# Patient Record
Sex: FEMALE | Race: WHITE
[De-identification: names, ages, dates, MRNs, and addresses within clinical notes are randomized per-mention and may not be internally consistent; named-entity substitution may affect disease eponyms.]

---

## 2021-03-23 ENCOUNTER — HOSPITAL ENCOUNTER (EMERGENCY)
Dept: HOSPITAL 15 - ER | Age: 21
Discharge: HOME | End: 2021-03-23
Payer: COMMERCIAL

## 2021-03-23 VITALS — BODY MASS INDEX: 39.24 KG/M2 | WEIGHT: 250 LBS | HEIGHT: 67 IN

## 2021-03-23 VITALS — SYSTOLIC BLOOD PRESSURE: 139 MMHG | DIASTOLIC BLOOD PRESSURE: 88 MMHG
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
## 2021-03-23 DIAGNOSIS — N39.0: ICD-10-CM

## 2021-03-23 DIAGNOSIS — S29.019A: Primary | ICD-10-CM

## 2021-03-23 DIAGNOSIS — F41.9: ICD-10-CM

## 2021-03-23 DIAGNOSIS — M41.9: ICD-10-CM
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
## 2021-03-23 PROCEDURE — 72080 X-RAY EXAM THORACOLMB 2/> VW: CPT

## 2021-03-23 PROCEDURE — 81002 URINALYSIS NONAUTO W/O SCOPE: CPT

## 2021-03-23 PROCEDURE — 81025 URINE PREGNANCY TEST: CPT

## 2021-03-23 PROCEDURE — 96372 THER/PROPH/DIAG INJ SC/IM: CPT

## 2021-03-23 PROCEDURE — 99283 EMERGENCY DEPT VISIT LOW MDM: CPT

## 2021-03-25 ENCOUNTER — HOSPITAL ENCOUNTER (EMERGENCY)
Dept: HOSPITAL 15 - ER | Age: 21
Discharge: HOME | End: 2021-03-25
Payer: COMMERCIAL

## 2021-03-25 VITALS — WEIGHT: 260 LBS | HEIGHT: 67 IN | BODY MASS INDEX: 40.81 KG/M2

## 2021-03-25 VITALS — SYSTOLIC BLOOD PRESSURE: 125 MMHG | DIASTOLIC BLOOD PRESSURE: 93 MMHG
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
## 2021-03-25 DIAGNOSIS — S90.461A: Primary | ICD-10-CM
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
## 2021-03-25 DIAGNOSIS — F41.9: ICD-10-CM

## 2021-04-08 ENCOUNTER — HOSPITAL ENCOUNTER (EMERGENCY)
Dept: HOSPITAL 15 - ER | Age: 21
Discharge: HOME | End: 2021-04-08
Payer: COMMERCIAL

## 2021-04-08 VITALS — HEIGHT: 67 IN | BODY MASS INDEX: 40.81 KG/M2 | WEIGHT: 260 LBS

## 2021-04-08 VITALS — SYSTOLIC BLOOD PRESSURE: 123 MMHG | DIASTOLIC BLOOD PRESSURE: 48 MMHG

## 2021-04-08 DIAGNOSIS — M62.830: ICD-10-CM
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
## 2021-04-08 DIAGNOSIS — S39.012A: Primary | ICD-10-CM
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
## 2021-04-08 DIAGNOSIS — F41.9: ICD-10-CM

## 2021-04-08 DIAGNOSIS — E66.01: ICD-10-CM

## 2021-04-08 PROCEDURE — 81002 URINALYSIS NONAUTO W/O SCOPE: CPT

## 2021-04-08 PROCEDURE — 81025 URINE PREGNANCY TEST: CPT

## 2021-04-12 ENCOUNTER — HOSPITAL ENCOUNTER (EMERGENCY)
Dept: HOSPITAL 15 - ER | Age: 21
Discharge: HOME | End: 2021-04-12
Payer: COMMERCIAL

## 2021-04-12 VITALS — WEIGHT: 280 LBS | BODY MASS INDEX: 43.95 KG/M2 | HEIGHT: 67 IN

## 2021-04-12 VITALS — DIASTOLIC BLOOD PRESSURE: 30 MMHG | SYSTOLIC BLOOD PRESSURE: 127 MMHG

## 2021-04-12 DIAGNOSIS — N39.0: ICD-10-CM

## 2021-04-12 DIAGNOSIS — M62.838: ICD-10-CM

## 2021-04-12 DIAGNOSIS — M54.5: Primary | ICD-10-CM

## 2021-04-12 PROCEDURE — 72100 X-RAY EXAM L-S SPINE 2/3 VWS: CPT

## 2021-04-12 PROCEDURE — 81025 URINE PREGNANCY TEST: CPT

## 2021-04-12 PROCEDURE — 81002 URINALYSIS NONAUTO W/O SCOPE: CPT

## 2021-06-13 ENCOUNTER — HOSPITAL ENCOUNTER (EMERGENCY)
Dept: HOSPITAL 15 - ER | Age: 21
Discharge: HOME | End: 2021-06-13
Payer: COMMERCIAL

## 2021-06-13 VITALS — DIASTOLIC BLOOD PRESSURE: 78 MMHG | SYSTOLIC BLOOD PRESSURE: 132 MMHG

## 2021-06-13 VITALS — HEIGHT: 62 IN | BODY MASS INDEX: 53.37 KG/M2 | WEIGHT: 290 LBS

## 2021-06-13 DIAGNOSIS — Z87.440: ICD-10-CM

## 2021-06-13 DIAGNOSIS — F32.9: ICD-10-CM

## 2021-06-13 DIAGNOSIS — L23.2: Primary | ICD-10-CM

## 2021-06-13 DIAGNOSIS — F41.9: ICD-10-CM

## 2022-04-04 ENCOUNTER — HOSPITAL ENCOUNTER (EMERGENCY)
Dept: HOSPITAL 15 - ER | Age: 22
LOS: 1 days | Discharge: HOME | End: 2022-04-05
Payer: COMMERCIAL

## 2022-04-04 VITALS — WEIGHT: 293 LBS | BODY MASS INDEX: 47.09 KG/M2 | HEIGHT: 66 IN

## 2022-04-04 DIAGNOSIS — Z91.14: ICD-10-CM

## 2022-04-04 DIAGNOSIS — F43.0: ICD-10-CM

## 2022-04-04 DIAGNOSIS — F32.9: ICD-10-CM

## 2022-04-04 DIAGNOSIS — F41.9: ICD-10-CM

## 2022-04-04 DIAGNOSIS — N39.0: Primary | ICD-10-CM

## 2022-04-04 PROCEDURE — 87086 URINE CULTURE/COLONY COUNT: CPT

## 2022-04-04 PROCEDURE — 81001 URINALYSIS AUTO W/SCOPE: CPT

## 2022-04-05 VITALS — SYSTOLIC BLOOD PRESSURE: 132 MMHG | DIASTOLIC BLOOD PRESSURE: 81 MMHG

## 2025-02-22 NOTE — DVH
Exam: CT CT AB PEL WO CON-NO ORAL OR IV



History: LLQ pain



Comparison Study:  None



Technique: Multidetector spiral CT of the abdomen was performed from lung bases to pubic symphysis.  
Imaging was performed without IV contrast.  Axial, coronal and sagittal multiplanar reformats were ob
tained from the axial data set by the technologist.



Radiation Dose : 



1. Abdomen/Pelvis:        CTDIvol 27.63 mGy, DLP 1464.22 mGy*cm.



Findings: 



Evaluation of solid organs is limited due to lack of intravenous contrast use.



Lung Bases: No abnormality demonstrated.  



Liver: Liver is normal in size.  No focal lesions. 



Gallbladder and Biliary Tree: No abnormality demonstrated.



Spleen: No abnormality demonstrated.



Pancreas: No abnormality demonstrated. 



Adrenal Glands: No abnormality demonstrated. 



Kidneys: No abnormality demonstrated. No renal calculus or hydroureteronephrosis. 



Bladder: Non distended.



Bowel: Stomach appears grossly unremarkable. No dilated or thick-walled loops of large or small bowel
 noted.  Appendix appears unremarkable. 



Ascites: Absent



Lymphadenopathy: No evidence of lymphadenopathy. 



Abdominal Wall and Mesentery: Unremarkable.



Vasculature: Unremarkable.



Pelvic Organs: No abnormality demonstrated. Evidence of small right adenexal cyst measuring approxima
tely 2.5 cm.



Musculoskeletal: No  bony lesions or fracture.   



IMPRESSION: 



No abnormality demonstrated. 



Radiation optimization: All CT scans at this facility use at least one of these dose optimization garland
hniques: automated exposure control  mA and/or kV adjustment per patient size (includes targeted exam
s where dose is matched to clinical indication)  or iterative reconstruction.



Electronically Signed by: Shane Núñez at 02/22/2025 20:26:06 PM

## 2025-02-22 NOTE — DVH
INDICATION: LLQ pain



TECHNIQUE: Multiple real-time grayscale transabdominal sonographic images along with color and duplex
 Doppler of the uterus and ovaries were obtained.



COMPARISON: None



FINDINGS: 



The uterus measures 7.4 X 3.3 X 4.5 cm and demonstrates no abnormality. Endometrium is within normal 
limits measuring 3.3 mm. 



Left ovary measures 2.3 X 1.8 X 2.8 cm and demonstrates no abnormality with normal Doppler color flow
.



Right ovary is not visualized.



No evidence of pelvic mass or fluid collection.



IMPRESSION: 



No abnormality demonstrated.



Electronically Signed by: Shane Núeñz at 02/22/2025 19:43:13 PM

## 2025-02-22 NOTE — ED.PDOC
General


HPI Comments


Initial Vital Signs:


Temp : 99F


BP: 124/85


HR: 90


RR: 18


SpO2: 99%





Past Medical History:


UTI, Gastritis, OCD





Past Surgical History:


Colonoscopy, Endoscopy





Social History:


Denies smoking, ETOH, or drug use.





Medications:


Sertraline, Keflex x4 days





Allergies:


NKDA





HPI:


Poor Historian.


24-year-old female presents to emergency department for evaluation of 10 day 

history of left lower quadrant/groin pain that is intermittent nonradiating.  No

alleviating precipitating factors.  Patient is visiting from [place].  Patient

went to her PCP in [place] UA was checked there and it was not positive and 

she was discharged home.  Patient then went to an ER and was told she has a UTI 

and was given Keflex which she has few pills left in the bottle.  She says that 

her pain has been getting worse.  She has minimal urinary symptoms of increased 

urinary frequency.  


Patient denies any pregnancy and denies being sexually active.  Denies any other

acute symptoms.





REVIEW OF SYSTEMS:





CONSTITUTIONAL:





Denies acute: fever, diaphoresis, chills,  generalized weakness.





HEAD:


Denies acute:  headache, photophobia





Eyes:


Denies acute: Double vision, vision loss, eye pain, eye discharge.





EARS: 


Denies acute: tinnitus, hearing loss, ear discharge, ear pain,





THROAT: 


Denies acute: sore throat, swelling, difficulty swallowing , pain with 

swallowing, change in  voice.





NECK:


Denies acute: neck pain, neck swelling, stiff neck.





HEART:


Denies acute : chest pain, palpitations,


 


LUNGS:


Denies acute: SOB, wheezing, cough, hemoptysis





ABDOMEN:


Denies acute:  , Nausea, Vomiting, diarrhea, melena , hematemesis, hematochezia





SKIN:


Denies acute: rash, redness, lesions, itchiness. 





EXTREMITIES:


Denies acute: calf pain, numbness, tingling, weakness, denies pain in extremity.


Denies acute: Low back pain. 





Neuro:


Denies acute: focal neurological deficit, motor or sensory focal neurological 

deficit, tremors, seizure like activity, confusion, dizziness, change in mental 

status, loss of bowel or bladder function, cauda equina like symptoms.  





: 


Denies acute: dysuria, hematuria, flank pain,  











PSYCH: 


Denies acute: hallucination, suicidal ideation, homicidal ideation.





FEMALE: 


Denies acute:  abnormal vaginal bleeding, foul odor, unusual discharge.  








PHYSICAL EXAM:


General: no acute distress, awake and alert.





Head: normocephalic, atraumatic.





Neck: 


supple, trachea is midline, no swelling. 








Throat:  Normal phonation.








Eyes:, no erythema, no purulent discharge, no proptosis, no icterus.


   


 


Heart:  regular rate, regular rhythm, no significant murmur appreciated.





Lungs: no apparent respiratory distress, 


   Able to speak in full sentences. 


No wheezing, no rhonchi, no crackles.  No stridors


   Clear to auscultation bilaterally.





Abdomen:  Left lower quadrant tender to palpation, non distended, soft, no 

guarding, no rebound,  + bowel sounds.  Morbidly obese











Neuro: Awake, Alert, oriented to name, self, situation, follows commands


 GCS=15.  


Speech is normal.





   


Skin: no petechia, no purpura, no cyanosis, non-pale, not jaundice. 





Lower extremities:  --no - Pitting edema


no deformity, no focal swelling, no calf TTP. 





Makes eye contact.





moves all four extremities.  


   





Face: no apparent facial droop.








No CVA tenderness to percussion bilaterally. 





Ambulating in the ED independently.





 


_________________________________________


ED COURSE:


Chief Complaint:  Urinary


Time Seen by MD:  18:38


Primary Care Provider:  OUT OF AREA IN [place]


Reviewed notes:  Medications, Allergies


Allergies:  


Coded Allergies:  


     NO KNOWN ALLERGIES (Unverified , 3/23/21)


Home Meds


Active Scripts


Ciprofloxacin Hcl (Ciprofloxacin Hcl) 500 Mg Tab, 1 TAB PO BID for 7 Days, #14 

TAB


   Prov:FIDENCIO TINAJERO MD         22


Information Source:  Patient


Mode of Arrival:  Ambulatory





Was a procedure done?


Was a procedure done?:  No





Differential Diagnosis


Kidney stone (Female):  N/A


Kidney stone (Male):  N/A


Urinary Problem (Male):  N/A


Urinary Problem (Female):  Other (DDX include Diverticulitis, colitis, 

gastroenteritis, acute abdomen, SBO, enteritis, constipation, volvulus, 

appendicitis, Gallbladder disease, choledocolithiasis, ascending cholangitis, 

pancreatitis, intraAbdominal mass/neoplasm, hepatitis, UTI, pylonephritis, 

kidney stone, aneurysm, dissection, Inflammatory bowel disease, gastroparesis,  

ischemic bowel, ovarian torsion, ovarian cyst/mass, tubo-ovarian abscess, 

pregnancy, ectopic pregnancy, PID, STD.)





X-Ray, Labs, Meds, VS





                                   Vital Signs








  Date Time  Temp Pulse Resp B/P (MAP) Pulse Ox O2 Delivery O2 Flow Rate FiO2


 


25 22:07 97.6 85 18 146/107 (120) 95   





 97.6       


 


25 21:20      Room Air* 0 21


 


25 18:20 99.0 90 18 124/85 (98) 99   








                                       Lab








Test


 25


20:44 25


18:44 25


18:15 Range/Units


 


 


Lactic Acid Level 2.1 *H 2.2 *H  0.4-2.0  mmol/L


 


White Blood Count


 


 12.2 H


 


 4.4-10.8


10^3/uL


 


Red Blood Count


 


 4.92 


 


 4.0-5.20


10^6/uL


 


Hemoglobin  11.2 L  12.2-16.2  g/dL


 


Hematocrit  34.7 L  36.0-46.0  %


 


Mean Corpuscular Volume  70.6 L  80.0-100.0  fL


 


Mean Corpuscular Hemoglobin  22.7 L  28.0-32.0  pg


 


Mean Corpuscular Hemoglobin


Concent 


 32.1 


 


 32.0-36.0  g/dL





 


Red Cell Distribution Width  17.3 H  11.8-14.3  %


 


Platelet Count


 


 375 


 


 140-450


10^3/uL


 


Mean Platelet Volume  7.8   6.9-10.8  fL


 


Neutrophils (%) (Auto)  60.1   37.0-80.0  %


 


Lymphocytes (%) (Auto)  32.0   10.0-50.0  %


 


Monocytes (%) (Auto)  4.6   0.0-12.0  %


 


Eosinophils (%) (Auto)  2.2   0.0-7.0  %


 


Basophils (%) (Auto)  1.1   0.0-2.0  %


 


Neutrophils # (Auto)


 


 7.3 


 


 1.6-8.6  10


^3/uL


 


Lymphocytes # (Auto)


 


 3.9 


 


 0.4-5.4  10


^3/uL


 


Monocytes # (Auto)  0.6   0-1.3  10 ^3/uL


 


Eosinophils # (Auto)  0.3   0-0.8  10 ^3/uL


 


Basophils # (Auto)  0.1   0-0.2  10 ^3/uL


 


Nucleated Red Blood Cells  0.0    %


 


Sodium Level  140   136-145  mmol/L


 


Potassium Level  3.7   3.5-5.1  mmol/L


 


Chloride Level  107     mmol/L


 


Carbon Dioxide Level  22   20-31  mmol/L


 


Anion Gap  11   5-15  


 


Blood Urea Nitrogen  7 L  9-23  mg/dL


 


Creatinine


 


 0.61 


 


 0.550-1.02


mg/dL


 


Glomerular Filtration Rate


Calc 


 128 


 


 >90  mL/min





 


BUN/Creatinine Ratio  11.5   10.0-20.0  


 


Serum Glucose  129 H    mg/dL


 


Calcium Level  10.0   8.7-10.4  mg/dL


 


Total Bilirubin  0.4   0.2-1.0  mg/dL


 


Aspartate Amino Transferase


(AST) 


 15 


 


 13-40  U/L





 


Alanine Aminotransferase (ALT)  22   7-40  U/L


 


Alkaline Phosphatase  85     U/L


 


Total Protein  6.8   5.7-8.2  g/dL


 


Albumin  4.6   3.2-4.8  g/dL


 


Urine Color   Light-yellow  Yellow  


 


Urine Clarity   Clear  Clear  


 


Urine pH   6.5  5.0-9.0  


 


Urine Specific Gravity   1.025  1.001-1.035  


 


Urine Protein   Negative  Negative  


 


Urine Ketones   Negative  Negative  


 


Urine Blood   Negative  Negative  /uL


 


Urine Nitrite   Negative  Negative  


 


Urine Bilirubin   Negative  Negative  


 


Urine Urobilinogen   Normal  Negative  mg/dL


 


Urine Leukocyte Esterase   Negative  Negative  /uL


 


Urine RBC   1  0 - 4  /hpf


 


Urine Microscopic WBC   < 1  0-5  /HPF


 


Urine Squamous Epithelial


Cells 


 


 Few 


 <5  /hpf





 


Urine Bacteria   None seen  None Seen  /hpf


 


Urine Mucus   Few  None Seen  


 


Urine Glucose   Normal  Normal  mg/dL








                               Current Medications








 Medications


  (Trade)  Dose


 Ordered  Sig/Jolynn


 Route  Start Time


 Stop Time Status Last Admin


 


 Piperacillin Sod/


 Tazobactam Sod  100 ml @ 


 100 mls/hr  ONCE  ONCE


 IV  25 20:00


 25 20:59 DC 25 21:43





 


 Sodium Chloride  1,000 ml @ 


 1,000 mls/hr  Q1H ONCE


 IV  25 20:00


 25 20:59 DC 25 21:43








[address] [address]


                              Ph: [phone]





                               DIAGNOSTIC IMAGING


                      Diagnostic Imaging Report : [id number]


                                     Signed








PATIENT: MARQUIS PANIAGUA    ACCT: [id number]        UNIT: [id number]


: 2000           LOC: ER                   ROOM / BED:  / 


AGE / SEX: 24 / F         ADM STATUS: REG ER        SERVICE DT: 25 [id number]





ORDERING PHYSICIAN: MARGARET BARCLAY DO


PROCEDURE(s): PELUS - PELVIC


REASON: LLQ pain


ORDER NUMBER(s): [id number], ACCESSION NUMBER(s): [id number]








INDICATION: LLQ pain





TECHNIQUE: Multiple real-time grayscale transabdominal sonographic images along 

with color and duplex Doppler of the uterus and ovaries were obtained.





COMPARISON: None





FINDINGS: 





The uterus measures 7.4 X 3.3 X 4.5 cm and demonstrates no abnormality. 

Endometrium is within normal limits measuring 3.3 mm. 





Left ovary measures 2.3 X 1.8 X 2.8 cm and demonstrates no abnormality with 

normal Doppler color flow.





Right ovary is not visualized.





No evidence of pelvic mass or fluid collection.





IMPRESSION: 





No abnormality demonstrated.





Electronically Signed by: Shane Bernstein at 2025 19:43:13 PM











DICTATED BY: SHANE BERNSTEIN MD


DICTATED DATE/TIME: 25





SIGNED BY: SHANE BERNSTEIN MD


SIGNED DATE/TIME: 25





CC: 








                             [place] [address]


                              Ph: [phone]





                               DIAGNOSTIC IMAGING


                      Diagnostic Imaging Report : [id number]


                                     Signed








PATIENT: MARQUIS PANIAGUA    ACCT: [id number]        UNIT: [id number]


: 2000           LOC: ER                   ROOM / BED:  / 


AGE / SEX: 24 / F         ADM STATUS: REG ER        SERVICE DT: 25





ORDERING PHYSICIAN: MARGARET BARCLAY DO


PROCEDURE(s): ABPL - CT AB PEL WO CON-NO ORAL OR IV


REASON: LLQ pain


ORDER NUMBER(s): [id number], ACCESSION NUMBER(s): [id number]








Exam: CT CT AB PEL WO CON-NO ORAL OR IV





History: LLQ pain





Comparison Study:  None





Technique: Multidetector spiral CT of the abdomen was performed from lung bases 

to pubic symphysis.  Imaging was performed without IV contrast.  Axial, coronal 

and sagittal multiplanar reformats were obtained from the axial data set by the 

technologist.





Radiation Dose : 





1. Abdomen/Pelvis:        CTDIvol 27.63 mGy, DLP 1464.22 mGy*cm.





Findings: 





Evaluation of solid organs is limited due to lack of intravenous contrast use.





Lung Bases: No abnormality demonstrated.  





Liver: Liver is normal in size.  No focal lesions. 





Gallbladder and Biliary Tree: No abnormality demonstrated.





Spleen: No abnormality demonstrated.





Pancreas: No abnormality demonstrated. 





Adrenal Glands: No abnormality demonstrated. 





Kidneys: No abnormality demonstrated. No renal calculus or 

hydroureteronephrosis. 





Bladder: Non distended.





Bowel: Stomach appears grossly unremarkable. No dilated or thick-walled loops of

large or small bowel noted.  Appendix appears unremarkable. 





Ascites: Absent





Lymphadenopathy: No evidence of lymphadenopathy. 





Abdominal Wall and Mesentery: Unremarkable.





Vasculature: Unremarkable.





Pelvic Organs: No abnormality demonstrated. Evidence of small right adenexal 

cyst measuring approximately 2.5 cm.





Musculoskeletal: No  bony lesions or fracture.   





IMPRESSION: 





No abnormality demonstrated. 





Radiation optimization: All CT scans at this facility use at least one of these 

dose optimization techniques: automated exposure control  mA and/or kV 

adjustment per patient size (includes targeted exams where dose is matched to 

clinical indication)  or iterative reconstruction.





Electronically Signed by: Shane Bernstein at 2025 20:26:06 PM











DICTATED BY: SHANE BERNSTEIN MD


DICTATED DATE/TIME: 25





SIGNED BY: SHANE BERNSTEIN MD


SIGNED DATE/TIME: 25





CC: 


Time of 1ST Reevaluation:  19:38


Reevaluation 1ST:  Unchanged


Patient Education/Counseling:  Diagnosis, Treatment


Family Education/Counseling:  No Family Present


Comments


Patient presented with the above HPI.--pelvic abdominal pain----workup was 

initiated. patient was found with the above mentioned diagnosis. 





the following medications were ordered:  


 please refer to order lists of meds and tests obtained by myself Dr. Barclay. 





Patient ED course and VS have been stabilized. Patient has been reassessed in 

the ED and remained in a stable condition.  





Pertinent incidental findings were discussed with the patient and/or family. 





Patient/family voices understanding and is agreeable with plan. 





Patient has been observed in the ED adequate length of time to insure 

improvement/stability. 





Escalation of care considered:


Consideration of escalation to observation or admission





 Patient was given antibiotics and given slight leukocytosis and slight elevated

lactic acid.





Patient was DISCHARGED home in a stable condition.





All the reports of any imaging studies that were ordered by myself were reviewed

by myself.





Departure 1


Departure


Time of Disposition:  20:50


Impression:  


   Primary Impression:  


   Left lower quadrant pain


   Additional Impression:  


   Anemia


Disposition:   HOME / SELF CARE / HOMELESS


Condition:  Stable





Additional Instructions:  


Additional discharge instructions:





You MUST follow-up with your primary care/family doctor in 1 to 2 days.





If you are unable to see your primary care/family doctor, please return to our 

emergency room for re-assessment and re-evaluation in 1 to 2 days.





Return to the emergency room here in our facility or to the nearest ER ASAP if 

your symptoms change or worsen.





CONSULTATIONS: you MUST Follow-up for consultation as soon as possible with:





Dr.-OB Cordova doctor in 1-2 days.  Please call for appointment.





You MUST call the consultants office yourself to make an appointment.  You may 

need to arrange that through your insurance and/or your primary/family doctor.





If you are unable to see the consultant in 1 to 2 days, you must return to our 

emergency room (or any other ER of your choice) for re-assessment and re-

evaluation.





Adequate fluid hydration.











Below is a copy of your radiological report for follow up:

















[place] [address]


Ph: [phone]





DIAGNOSTIC IMAGING


Diagnostic Imaging Report : [id number]


Signed








PATIENT: MARQUIS PANIAGUA   ACCT: [id number]   UNIT: [id number]


: 2000   LOC: ER   ROOM / BED:  / 


AGE / SEX: 24 / F   ADM STATUS: REG ER   SERVICE DT: 25





ORDERING PHYSICIAN: MARGARET BARCLAY DO


PROCEDURE(s): ABPL - CT AB PEL WO CON-NO ORAL OR IV


REASON: LLQ pain


ORDER NUMBER(s): [id number], ACCESSION NUMBER(s): [id number]








Exam: CT CT AB PEL WO CON-NO ORAL OR IV





History: LLQ pain





Comparison Study:  None





Technique: Multidetector spiral CT of the abdomen was performed from lung bases 

to pubic symphysis.  Imaging was performed without IV contrast.  Axial, coronal 

and sagittal multiplanar reformats were obtained from the axial data set by the 

technologist.





Radiation Dose : 





1. Abdomen/Pelvis:        CTDIvol 27.63 mGy, DLP 1464.22 mGy*cm.





Findings: 





Evaluation of solid organs is limited due to lack of intravenous contrast use.





Lung Bases: No abnormality demonstrated.  





Liver: Liver is normal in size.  No focal lesions. 





Gallbladder and Biliary Tree: No abnormality demonstrated.





Spleen: No abnormality demonstrated.





Pancreas: No abnormality demonstrated. 





Adrenal Glands: No abnormality demonstrated. 





Kidneys: No abnormality demonstrated. No renal calculus or 

hydroureteronephrosis. 





Bladder: Non distended.





Bowel: Stomach appears grossly unremarkable. No dilated or thick-walled loops of

large or small bowel noted.  Appendix appears unremarkable. 





Ascites: Absent





Lymphadenopathy: No evidence of lymphadenopathy. 





Abdominal Wall and Mesentery: Unremarkable.





Vasculature: Unremarkable.





Pelvic Organs: No abnormality demonstrated. Evidence of small right adenexal 

cyst measuring approximately 2.5 cm.





Musculoskeletal: No  bony lesions or fracture.   





IMPRESSION: 





No abnormality demonstrated. 





Radiation optimization: All CT scans at this facility use at least one of these 

dose optimization techniques: automated exposure control  mA and/or kV 

adjustment per patient size (includes targeted exams where dose is matched to 

clinical indication)  or iterative reconstruction.





Electronically Signed by: Shane Bernstein at 2025 20:26:06 PM











DICTATED BY: SHANE BERNSTEIN MD


DICTATED DATE/TIME: 25





SIGNED BY: SHANE BERNSTEIN MD


SIGNED DATE/TIME: 25





CC: 








[place] [address]


Ph: [phone]





DIAGNOSTIC IMAGING


Diagnostic Imaging Report : [id number]


Signed








PATIENT: MARQUIS PANIAGUA   ACCT: [id number]   UNIT: [id number]


: 2000   LOC: ER   ROOM / BED:  / 


AGE / SEX: 24 / F   ADM STATUS: REG ER   SERVICE DT: 25 183





ORDERING PHYSICIAN: MARGARET BARCLAY DO


PROCEDURE(s): PELUS - PELVIC


REASON: LLQ pain


ORDER NUMBER(s): [id number], ACCESSION NUMBER(s): [id number]








INDICATION: LLQ pain





TECHNIQUE: Multiple real-time grayscale transabdominal sonographic images along 

with color and duplex Doppler of the uterus and ovaries were obtained.





COMPARISON: None





FINDINGS: 





The uterus measures 7.4 X 3.3 X 4.5 cm and demonstrates no abnormality. 

Endometrium is within normal limits measuring 3.3 mm. 





Left ovary measures 2.3 X 1.8 X 2.8 cm and demonstrates no abnormality with 

normal Doppler color flow.





Right ovary is not visualized.





No evidence of pelvic mass or fluid collection.





IMPRESSION: 





No abnormality demonstrated.





Electronically Signed by: Shane Bernstein at 2025 19:43:13 PM











DICTATED BY: SHANE BERNSTEIN MD


DICTATED DATE/TIME: 25





SIGNED BY: SHANE BERNSTEIN MD


SIGNED DATE/TIME: 25





CC:


e-Prescriptions


Ciprofloxacin Hcl (Cipro) 500 Mg Tab


500 MG PO BID for 7 Days, #14 CAP


   Prov: MARGARET BARCLAY DO         25


Discharged With:  Self





Critical Care Note


Critical Care Time?:  No








I personally scribed for MARGARET BARCLAY DO ([email or web address]) on 25 at 18:39.  

Electronically submitted by Moises Zimmerman ([email or web address]).


I personally scribed for MARGARET BARCLAY DO ([email or web address]) on 25 at 18:46.  

Electronically submitted by Moises Zimmerman ([email or web address]).


I personally scribed for MARGARET BARCLAY DO ([email or web address]) on 25 at 20:06.  

Electronically submitted by Moises Zimmerman ([email or web address]).


I personally scribed for MARGARET BARCLAY DO ([email or web address]) on 25 at 20:47.  

Electronically submitted by Moises Zimmerman ([email or web address]).





MARGARET BARCLAY DO                2025 18:39